# Patient Record
Sex: FEMALE | Race: WHITE | Employment: FULL TIME | URBAN - METROPOLITAN AREA
[De-identification: names, ages, dates, MRNs, and addresses within clinical notes are randomized per-mention and may not be internally consistent; named-entity substitution may affect disease eponyms.]

---

## 2017-06-22 ENCOUNTER — OFFICE VISIT (OUTPATIENT)
Dept: OTOLARYNGOLOGY | Facility: CLINIC | Age: 37
End: 2017-06-22
Payer: COMMERCIAL

## 2017-06-22 DIAGNOSIS — L98.9 SKIN LESION: Primary | ICD-10-CM

## 2017-06-22 PROCEDURE — 99204 OFFICE O/P NEW MOD 45 MIN: CPT | Performed by: OTOLARYNGOLOGY

## 2017-06-22 RX ORDER — NORGESTIMATE AND ETHINYL ESTRADIOL 7DAYSX3 28
KIT ORAL
COMMUNITY
Start: 2017-05-18

## 2017-06-22 ASSESSMENT — PAIN SCALES - GENERAL: PAINLEVEL: NO PAIN (0)

## 2017-06-22 NOTE — MR AVS SNAPSHOT
After Visit Summary   6/22/2017    Etta Luna    MRN: [id number]           Patient Information     Date Of Birth          1980        Visit Information        Provider Department      6/22/2017 10:30 AM Jacques Gomez MD [facility]        Today's Diagnoses     Skin lesion    -  1      Care Instructions    Please contact our clinic if you have questions or if problems arise:    [place] office: [phone]  [facility] office: [phone]    If it is an urgent matter when the clinic is closed, please contact the [facility]  [phone] and ask to have Dr. Jacques Gomez, or the ENT resident on-call paged. If it is a serious matter requiring immediate attention, please call 911 or go to your nearest emergency department.            Follow-ups after your visit        Who to contact     If you have questions or need follow up information about today's clinic visit or your schedule please contact [facility] directly at [phone].  Normal or non-critical lab and imaging results will be communicated to you by Truziphart, letter or phone within 4 business days after the clinic has received the results. If you do not hear from us within 7 days, please contact the clinic through Truziphart or phone. If you have a critical or abnormal lab result, we will notify you by phone as soon as possible.  Submit refill requests through Environment[employer] or call your pharmacy and they will forward the refill request to us. Please allow 3 business days for your refill to be completed.          Additional Information About Your Visit        Truziphart Information     [employer] is an electronic gateway that provides easy, online access to your medical records. With [employer], you can request a clinic appointment, read your test results, renew a prescription or communicate with your care team.     To sign up for [employer] visit the website at  [email or web address]   You will be asked to enter the access code listed below, as well as some personal information. Please follow the directions to create your username and password.     Your access code is: [email or web address]  Expires: 2017 11:30 AM     Your access code will  in 90 days. If you need help or a new code, please contact your [facility] or call [phone] for assistance.        Care EveryWhere ID     This is your Care EveryWhere ID. This could be used by other organizations to access your [place] medical records  [id number]         Blood Pressure from Last 3 Encounters:   No data found for BP    Weight from Last 3 Encounters:   No data found for Wt              Today, you had the following     No orders found for display       Primary Care Provider    No Pcp Confirmed       No address on file        Equal Access to Services     EMERY ANTON : Hadii cindy Baca, waaxda luqadaha, qaybta kaalmamarlys aguilar, duke santillan . So [facility] [phone].    ATENCIÓN: Si habla español, tiene a malone disposición servicios gratuitos de asistencia lingüística. Franco al [phone].    We comply with applicable federal civil rights laws and [place] laws. We do not discriminate on the basis of race, color, national origin, age, disability sex, sexual orientation or gender identity.            Thank you!     Thank you for choosing [facility]  for your care. Our goal is always to provide you with excellent care. Hearing back from our patients is one way we can continue to improve our services. Please take a few minutes to complete the written survey that you may receive in the mail after your visit with us. Thank you!             Your Updated Medication List - Protect others around you: Learn how to safely use, store and throw away your medicines at www.disposemymeds.org.          This list is accurate as of: 17  11:30 AM.  Always use your most recent med list.                   Brand Name Dispense Instructions for use Diagnosis    levonorgestrel 20 MCG/24HR IUD    MIRENA     1 each by Intrauterine route        norgestim-eth estrad triphasic 0.18/0.215/0.25 MG-35 MCG per tablet    ORTHO TRI-CYCLEN, TRI-SPRINTEC

## 2017-06-22 NOTE — PROGRESS NOTES
Denver Henry MD    Dear Doctor Carl,    Thank you for asking me to see your patient, Ms. Etta Luna, in consultation to evaluate her skin lesions.  Today I had the pleasure of seeing her at the Facial Plastic and Reconstructive Surgery Clinic in the Department of Otolaryngology at [facility].    CLINICAL SUMMARY:   Diagnoses:  1) Right nasal skin lesions x 2.    Comorbidities: None  Pertinent medications: None  Photographs: [facility] consents signed June 22, 2017.   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    Care Checklist:   _Patient will consider her options of excision vs shave biopsy vs observation and understands the associated risks of each option (scar with excision and regrowth with the shave biopsy) and will call us to schedule the procedure she is most comfortable with (15 minutes for shave biopsy, excision 75 minutes).      MEDICAL DECISION MAKING:   I recommend excision of these lesions for both diagnostic and treatment purposes because of clinical uncertainty as to the exact diagnosis.  An extensive preprocedure discussion was held.  Ms. Luna agrees with this plan. We have initiated procedure scheduling.  I look forward to seeing her on her procedure day.        It has been a pleasure to participate in the care of Ms. Luna.  Thank you for this kind referral.      Sincerely,    Jacques Gomez MD    Division of Facial Plastic and Reconstructive Surgery,   Department of Otolaryngology  [facility]   ______________________________________________________________________                HISTORY OF PRESENT ILLNESS and SKIN LESION QUESTIONAAIRE:  As you know, Ms. Luna is an 37 year old-year-old female who presents with a skin lesion located on the right  nasal mid-sidewall.   She first noticed this lesion since childhood.    Previous biopsy of this lesion: none.     Bleeding: none.   Provider- Bleeding: none.     Pain:  "none.  Provider- Pain: none.    Color change: none.   Provider- Color change: none.     Growth: \"Not substantially\".  Provider- Growth: There has been growth anterior and inferior to the original lesion.     Ulceration: none.   Itching: none.  Purulence: none.   Oozing: none.   Edema: none.   Erythema: none.   History of rupture: none.   Recurrent physical trauma: none.       The patient also has a second lesion of concern, located on the right  superior nasal sidewall.   She first noticed this second lesion 1-2 years.   Previous biopsy of this lesion: none.       Bleeding: none.   Provider- Bleeding: none.     Pain: none.  Provider- Pain: none.    Color change: none.   Provider- Color change: none.     Growth: because it's new  Provider- Growth: It showed up about 2 years ago and has grown significantly since that time.     Ulceration: none.   Itching: none.  Purulence: none.   Oozing: none.   Edema: none.   Erythema: none.   History of rupture: none.   Recurrent physical trauma: none.           REVIEW OF SYSTEMS: a 12 system review was performed by the patient care staff:    Do you currently have or have you ever had in the past:    No. Complications with sedation or anesthesia.  No. Use blood thinners. No   No. Any heart problems.   No. Chest pain.   No. A pacemaker.  No. Problems with excessive bleeding or a bleeding disorder.  No. Problems with blood clots or a clotting disorder.   No. Sleep apnea or sleep with a CPAP machine.       No. Excessive scarring.   No. Night sweats.   No. Fevers.   No. Double vision.   No. Vision loss.   Yes - \"maybe occasionally\". Snoring.   No. Difficulty breathing through your nose.   No. Runny nose.   No. Sneezing.   No. Itchy eyes.   No. Itchy throat.   No. Face pain.   No. Face weakness.   No. Face numbness.   No. Difficulty swallowing.   No. Pain with swallowing.,   No. Difficulty hearing or hearing loss.   No. Difficulty urinating.   No. Anxiety.   No. Depression.     FAMILY " HISTORY:  No. Family history of excessive bleeding or a bleeding disorder.   No. Family history of blood clots or a clotting disorder.     No. Family history of skin cancer.    History reviewed. No pertinent family history.    PAST MEDICAL HISTORY: History reviewed. No pertinent past medical history.    PAST SURGICAL HISTORY: History reviewed. No pertinent surgical history.    SOCIAL HISTORY:   Social History   Substance Use Topics     Smoking status: Never Smoker     Smokeless tobacco: Not on file     Alcohol use Not on file       ALLERGIES: Review of patient's allergies indicates no known allergies.    MEDICATIONS:   Current Outpatient Prescriptions   Medication Sig Dispense Refill     levonorgestrel (MIRENA) 20 MCG/24HR IUD 1 each by Intrauterine route       norgestim-eth estrad triphasic (ORTHO TRI-CYCLEN, TRI-SPRINTEC) 0.18/0.215/0.25 MG-35 MCG per tablet            Patient Care Staff Signature: Shana Aldana LPN    ______________________________________________    Provider- Review of systems, FH, PMH, PSH, SH, ALL, and Medications taken by the patient care staff was reviewed by me: Jacques Gomez      Provider- PHYSICAL EXAMINATION:  CONSTITUTIONAL:  No apparent distress.  Pleasant affect.  Normal ability to communicate.  CRANIOFACIAL:  Normocephalic, atraumatic.    SKIN:      Lesion 1.  location:  right  superior nasal sidewall.  size: 2x3mm.  height: raised.  color. hetergenous slight brown color in center  borders: smooth.  ulceration: absent.  bleeding: absent.    Lesion 2.  location:  right  mid nasal sidewall.  size: 4x6mm.  height: raised.  color. Skin colored  borders: irregular aspect at anterior inferior border which may represent an entirely new lesion or an irregular extension.  ulceration: absent.  bleeding: absent.    EYES:  Extraocular muscles intact.  EARS:  Normal auricles.   NOSE: No external nasal valve collapse.  Slight septal deviation.  No polyps or purulence.  ORAL CAVITY AND  OROPHARYNX: no lesions on inspection.  NECK:  The parotid is soft, without masses.  Supple laryngeal landmarks.  LYMPHATIC:  No palpable lymphadenopathy.  CARDIOVASCULAR:  Carotid pulses are palpable bilaterally.  NEUROLOGIC:  Facial nerve intact.  RESPIRATORY:  Normal respiratory effort.  No stridor.  Voice strong.        PREPROCEDURE COUNSELING FOR LESION EXCISION AND CLOSURE:  An extensive preoperative discussion was held.  The patient stated she understood the risks, benefits, alternatives and limitations of the procedure.  The risks were discussed, including but not limited to: bleeding, infection, damage to surrounding structures, weakness, numbness, chronic pain, unfavorable change in her appearance, partial or total skin loss, widening of her scar, excessive scarring, extruded sutures, positive margins requiring further resection, discovery of a malignancy requiring treatment and unforeseen complications related to the procedure or anesthesia.  I described the limitation of this procedure in that a permenant scar at the surgery site(s) will always be present. The scar will be at least 3 times longer than the length of the lesion. I described how the scar will be red for many months and will hopefully fade over time.  She also understands that there may be distortion of her surrounding anatomy including her nostril margin.  The patient stated she understood these risks and limitations and elects to proceed with surgery.  She also stated she had her questions answered to her satisfaction.      PREPROCEDURE COUNSELING FOR SHAVE BIOPSY: An extensive preprocedure discussion was held. The patient stated she understood the risks, benefits, alternatives and limitations of the procedure. The risks including but not limited to bleeding, infection, damage to surrounding skin, scarring, failure to diagnose cancer, need for further biopsies, prolonged erythema, unfavorable change in her appearance, and unforeseen  complications related to the procedure or the anesthetic were described in detail. She understands the risk of regrowth is high with a shave biopsy. I also emphasized that I anticipate the wound to remain erythematous for many months up to 1 year and there will be some scarring within the healing tissue. The patient understood that this procedure is only a biopsy and further treatment may be recommended. The patient stated she had her questions answered to her satisfaction. She accepts these risks, limitations, and expected outcome and wishes to proceed with the shave biopsy.

## 2017-06-22 NOTE — PATIENT INSTRUCTIONS
Please contact our clinic if you have questions or if problems arise:    [place] office: [phone]  [facility] office: [phone]    If it is an urgent matter when the clinic is closed, please contact the [facility]  [phone] and ask to have Dr. Jacques Gomez, or the ENT resident on-call paged. If it is a serious matter requiring immediate attention, please call 911 or go to your nearest emergency department.

## 2017-06-22 NOTE — NURSING NOTE
Etta Luna's goals for this visit include:   Chief Complaint   Patient presents with     Consult       She requests these members of her care team be copied on today's visit information: Confirmed, No Pcp      PCP: Confirmed, No Pcp    Referring Provider:  Referred Self, MD  No address on file    Chief Complaint   Patient presents with     Consult       Initial There were no vitals taken for this visit. There is no height or weight on file to calculate BMI.  Medication Reconciliation: complete    Do you need any medication refills at today's visit? No    Shana Aldana LPN

## 2020-08-13 ENCOUNTER — TELEPHONE (OUTPATIENT)
Dept: OTOLARYNGOLOGY | Facility: CLINIC | Age: 40
End: 2020-08-13

## 2020-08-13 NOTE — TELEPHONE ENCOUNTER
[facility] Call Center    Phone Message    May a detailed message be left on voicemail: yes     Reason for Call: Other: Patient states she would like to continue with the procedure that was discussed at her appt 06/22/2017. Patient would like call back to discuss, did not want to schedule at this time.      Action Taken: Message routed to:  Adult Clinics: ENT p [phone]    Travel Screening: Not Applicable

## 2020-08-14 NOTE — TELEPHONE ENCOUNTER
Phone call to pt, left message stating appointment would be needed prior to scheduling procedure. Asked pt to call back to discuss. Giselle Dan RN

## 2020-08-14 NOTE — TELEPHONE ENCOUNTER
Phone call received from pt.  Lesion excision consultation appointment scheduled with Dr Gomez on 9/17/20. Giselle Dan RN

## 2020-09-11 NOTE — PROGRESS NOTES
CLINICAL SUMMARY:   Diagnoses:  1) Right nasal skin lesions x 2.     Comorbidities: None  Pertinent medications: None  Photographs: [facility] consents signed June 22, 2017.   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    Care Checklist:   _Patient will consider her options of excision vs shave biopsy vs observation and understands the associated risks of each option (scar with excision and regrowth with the shave biopsy) and will call us to schedule the procedure she is most comfortable with (15 minutes for shave biopsy, excision 75 minutes). Update 9/17/20: She has thought about her options and wants a shave biopsy. She repeated back the risks of each option including regrowth of the lesion after a shave biopsy and need to monitor for concerning skin changes in those areas.   _RTC 1 week to monitor healing of shave site.   _Path from shave biopsy on 9/17/20.      Mrs. Luna returns today because she continues to notice and be concerned by the growths on the right side of her nose. She has noticed that they have grown since our last visit, especially the right superior lesion which has seemed to grow faster over the last year. No pain, bleeding or discharge from those two areas on her nose.     Exam:  CONSTITUTIONAL:  No apparent distress.  Pleasant affect.  Normal ability to communicate.  CRANIOFACIAL:  Normocephalic, atraumatic.    SKIN:      Lesion 1.  location:  right  superior nasal sidewall.  size: 4x4mm.  height: raised.  color. Heterogenous brown color in the center  borders: smooth.  ulceration: absent.  bleeding: absent.    Lesion 2.  location:  right  mid nasal sidewall.  size: 5x6mm.  height: raised.  color. Skin colored  borders: significant irregularity at the anterior border.  ulceration: absent.  bleeding: absent.    NEUROLOGIC:  Facial nerve intact.  RESPIRATORY:  Normal respiratory effort.  No stridor.  Voice strong.    Medial Decision Making: I continue to recommend either shave biopsy of excision of those two  lesions. Both have grown and are concerning for her. One has irregular pigmentation and the other has irregular borders. She elects to undergo shave biopsy and understands the risk of regrowth and the need to monitor the area for her lifetime.     PREPROCEDURE COUNSELING FOR SHAVE BIOPSY: An extensive preprocedure discussion was held. The patient stated she understood the risks, benefits, alternatives and limitations of the procedure. The risks including but not limited to bleeding, infection, damage to surrounding skin, scarring, failure to diagnose cancer, need for further biopsies, prolonged erythema, unfavorable change in her appearance, and unforeseen complications related to the procedure or the anesthetic were described in detail. I also emphasized that I anticipate the wound to remain erythematous for many months up to 1 year and there will be some scarring within the healing tissue.  The patient understood that this procedure is only a biopsy and further treatment may be recommended. The patient stated she had her questions answered to her satisfaction. She accepts these risks, limitations, and expected outcome and wishes to proceed with the shave biopsy.     Procedure: Shave biospy of two separate right nasal side wall lesions (superior and inferior). Informed consent was obtained. A time out was performed. Each lesion and surrounding skin was prepared with alcohol. 1% lidocaine with 1:100,000 epinephrine was injected around the lesions. The concerning areas were shaved with a 15 blade in the dermal plane. Hemostasis was adequate with a light pressure dressing. Wound care instructions were discussed with the patient. Patient tolerated the procedure well and was discharged in stable condition.

## 2020-09-17 ENCOUNTER — OFFICE VISIT (OUTPATIENT)
Dept: OTOLARYNGOLOGY | Facility: CLINIC | Age: 40
End: 2020-09-17
Payer: COMMERCIAL

## 2020-09-17 VITALS — HEART RATE: 79 BPM | SYSTOLIC BLOOD PRESSURE: 125 MMHG | DIASTOLIC BLOOD PRESSURE: 83 MMHG

## 2020-09-17 DIAGNOSIS — L98.9 SKIN LESION: Primary | ICD-10-CM

## 2020-09-17 PROCEDURE — 88341 IMHCHEM/IMCYTCHM EA ADD ANTB: CPT | Performed by: OTOLARYNGOLOGY

## 2020-09-17 PROCEDURE — 88305 TISSUE EXAM BY PATHOLOGIST: CPT | Performed by: OTOLARYNGOLOGY

## 2020-09-17 PROCEDURE — 88342 IMHCHEM/IMCYTCHM 1ST ANTB: CPT | Performed by: OTOLARYNGOLOGY

## 2020-09-17 PROCEDURE — 11102 TANGNTL BX SKIN SINGLE LES: CPT | Performed by: OTOLARYNGOLOGY

## 2020-09-17 PROCEDURE — 11103 TANGNTL BX SKIN EA SEP/ADDL: CPT | Performed by: OTOLARYNGOLOGY

## 2020-09-17 ASSESSMENT — PAIN SCALES - GENERAL: PAINLEVEL: NO PAIN (0)

## 2020-09-17 NOTE — PATIENT INSTRUCTIONS
Patient Instructions after a shave biopsy:      Our nursing staff will call in 1 week with the biopsy result. If you do not hear from our office in 10-14 days, please call us at [phone].    Keep the bandage on the wound for 1 day. Then remove the bandage and start placing ointment.     Please keep your facial wound covered at all times with ointment to keep the wound healthy. Dryness and crusting means the wound is unhealthy. You many need to apply ointment every 2 hours while you are awake to keep the wound constantly moist. If the wound is near your eyes, use Erythromycin Ophthalmic Ointment (ointment that is safe to get into the eyes). Otherwise, if it is away from your eyes, use Vaseline on the wound. Make sure the wound is always moist and covered with ointment.    You can choose to wear a dressing or bandage to camoflauge the wound, but a dressing or bandage is not necessary unless you work in a dirty or marleny environment. Covering the wound at all times with ointment to maintain moisture is necessary. If you wear a dressing or bandage, check under the dressing frequently to make sure the wound does not dry out.      You can shower and let the wound get wet in 48 hours. Do not scrub the wound. After your shower, gently pat the wound dry with a clean towel and apply more ointment.    Please contact our clinic if you have questions or if problems arise: [place] office: [phone]. If it is an urgent matter when the clinic is closed, please contact the [facility]  [phone] and ask to have Dr. Jacques Gomez, or the ENT resident on-call paged. If it is a serious matter requiring immediate attention, please call 911 or go to your nearest emergency department.

## 2020-09-17 NOTE — NURSING NOTE
Etta Luna's goals for this visit include:   Chief Complaint   Patient presents with     Other     Discussion of two lesions on right side of nose.     She requests these members of her care team be copied on today's visit information:     PCP: No Ref-Primary, Physician    Referring Provider:  No referring provider defined for this encounter.    /83 (BP Location: Right arm, Patient Position: Sitting, Cuff Size: Adult Large)   Pulse 79     Do you need any medication refills at today's visit? No    Pallavi Tomlin LPN

## 2020-09-17 NOTE — LETTER
9/17/2020         RE: Etta Luna  [address] [place] [address]        Dear Colleague,    Thank you for referring your patient, Etta Luna, to the [facility]. Please see a copy of my visit note below.    CLINICAL SUMMARY:   Diagnoses:  1) Right nasal skin lesions x 2.     Comorbidities: None  Pertinent medications: None  Photographs: [facility] consents signed June 22, 2017.   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    Care Checklist:   _Patient will consider her options of excision vs shave biopsy vs observation and understands the associated risks of each option (scar with excision and regrowth with the shave biopsy) and will call us to schedule the procedure she is most comfortable with (15 minutes for shave biopsy, excision 75 minutes). Update 9/17/20: She has thought about her options and wants a shave biopsy. She repeated back the risks of each option including regrowth of the lesion after a shave biopsy and need to monitor for concerning skin changes in those areas.   _RTC 1 week to monitor healing of shave site.   _Path from shave biopsy on 9/17/20.      Mrs. Luna returns today because she continues to notice and be concerned by the growths on the right side of her nose. She has noticed that they have grown since our last visit, especially the right superior lesion which has seemed to grow faster over the last year. No pain, bleeding or discharge from those two areas on her nose.     Exam:  CONSTITUTIONAL:  No apparent distress.  Pleasant affect.  Normal ability to communicate.  CRANIOFACIAL:  Normocephalic, atraumatic.    SKIN:      Lesion 1.  location:  right  superior nasal sidewall.  size: 4x4mm.  height: raised.  color. Heterogenous brown color in the center  borders: smooth.  ulceration: absent.  bleeding: absent.    Lesion 2.  location:  right  mid nasal sidewall.  size: 5x6mm.  height: raised.  color. Skin colored  borders: significant irregularity at  the anterior border.  ulceration: absent.  bleeding: absent.    NEUROLOGIC:  Facial nerve intact.  RESPIRATORY:  Normal respiratory effort.  No stridor.  Voice strong.    Medial Decision Making: I continue to recommend either shave biopsy of excision of those two lesions. Both have grown and are concerning for her. One has irregular pigmentation and the other has irregular borders. She elects to undergo shave biopsy and understands the risk of regrowth and the need to monitor the area for her lifetime.     PREPROCEDURE COUNSELING FOR SHAVE BIOPSY: An extensive preprocedure discussion was held. The patient stated she understood the risks, benefits, alternatives and limitations of the procedure. The risks including but not limited to bleeding, infection, damage to surrounding skin, scarring, failure to diagnose cancer, need for further biopsies, prolonged erythema, unfavorable change in her appearance, and unforeseen complications related to the procedure or the anesthetic were described in detail. I also emphasized that I anticipate the wound to remain erythematous for many months up to 1 year and there will be some scarring within the healing tissue.  The patient understood that this procedure is only a biopsy and further treatment may be recommended. The patient stated she had her questions answered to her satisfaction. She accepts these risks, limitations, and expected outcome and wishes to proceed with the shave biopsy.     Procedure: Shave biospy of two separate right nasal side wall lesions (superior and inferior). Informed consent was obtained. A time out was performed. Each lesion and surrounding skin was prepared with alcohol. 1% lidocaine with 1:100,000 epinephrine was injected around the lesions. The concerning areas were shaved with a 15 blade in the dermal plane. Hemostasis was adequate with a light pressure dressing. Wound care instructions were discussed with the patient. Patient tolerated the  procedure well and was discharged in stable condition.              Again, thank you for allowing me to participate in the care of your patient.        Sincerely,        Jacques Gomez MD

## 2020-09-23 NOTE — PROGRESS NOTES
CLINICAL SUMMARY:   Diagnoses:  1) Right nasal skin lesions x 2.     Comorbidities: None  Pertinent medications: None  Photographs: [facility] consents signed June 22, 2017.   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    Care Checklist:   _RTC as needed.  _Path from shave biopsy on 9/17/20. Still pending.       Facial Plastic and Reconstructive Surgery Note    Today I had the pleasure of seeing the patient at the Facial Plastic and Reconstructive Surgery Clinic in the Department of Otolaryngology at [facility]. The patient underwent reconstruction last week. No pain. No bleeding or discharge from the wound.    Both wounds appear to be healing well. Fully epithealized. Surrounding skin appear viable.    Pathology from the biopsy still pending    Impression and Recommendations: Two separate lesions. Path still pending. I asked her to call us next week if we have not contacted her by then. I encouraged the patient to call or return anytime if questions or problems arise. Jacques Gomez MD

## 2020-09-24 ENCOUNTER — OFFICE VISIT (OUTPATIENT)
Dept: OTOLARYNGOLOGY | Facility: CLINIC | Age: 40
End: 2020-09-24
Payer: COMMERCIAL

## 2020-09-24 DIAGNOSIS — L98.9 SKIN LESION: Primary | ICD-10-CM

## 2020-09-24 PROCEDURE — 99211 OFF/OP EST MAY X REQ PHY/QHP: CPT | Performed by: OTOLARYNGOLOGY

## 2020-09-24 ASSESSMENT — PAIN SCALES - GENERAL: PAINLEVEL: NO PAIN (0)

## 2020-09-24 NOTE — LETTER
9/24/2020         RE: Etta Luna  [address] [place] [address]        Dear Colleague,    Thank you for referring your patient, Etta Luna, to the [facility]. Please see a copy of my visit note below.    CLINICAL SUMMARY:   Diagnoses:  1) Right nasal skin lesions x 2.     Comorbidities: None  Pertinent medications: None  Photographs: [facility] consents signed June 22, 2017.   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    Care Checklist:   _RTC as needed.  _Path from shave biopsy on 9/17/20. Still pending.       Facial Plastic and Reconstructive Surgery Note    Today I had the pleasure of seeing the patient at the Facial Plastic and Reconstructive Surgery Clinic in the Department of Otolaryngology at [facility]. The patient underwent reconstruction last week. No pain. No bleeding or discharge from the wound.    Both wounds appear to be healing well. Fully epithealized. Surrounding skin appear viable.    Pathology from the biopsy still pending    Impression and Recommendations: Two separate lesions. Path still pending. I asked her to call us next week if we have not contacted her by then. I encouraged the patient to call or return anytime if questions or problems arise. Jacques Gomez MD      Again, thank you for allowing me to participate in the care of your patient.        Sincerely,        Jacques Gomez MD

## 2020-09-24 NOTE — NURSING NOTE
Etta Luna's goals for this visit include:   Chief Complaint   Patient presents with     Follow Up     1 week post shave biopsy     She requests these members of her care team be copied on today's visit information: No    PCP: No Ref-Primary, Physician    Referring Provider:  No referring provider defined for this encounter.    There were no vitals taken for this visit.    Do you need any medication refills at today's visit? No    Jmeima Negron LPN

## 2020-10-01 LAB — COPATH REPORT: NORMAL

## 2020-10-14 ENCOUNTER — TELEPHONE (OUTPATIENT)
Dept: OTOLARYNGOLOGY | Facility: CLINIC | Age: 40
End: 2020-10-14

## 2020-10-14 NOTE — TELEPHONE ENCOUNTER
"Phone call to pt with pathology results showing \"intradermal melanocytic nevus\". Discussed that this is commonly called a mole.  Pt states biopsy sites continue to look good.  Asked pt to call back if further questions or concerns arise. Gsielle Dan RN  "